# Patient Record
Sex: FEMALE | Race: OTHER | Employment: UNEMPLOYED | URBAN - METROPOLITAN AREA
[De-identification: names, ages, dates, MRNs, and addresses within clinical notes are randomized per-mention and may not be internally consistent; named-entity substitution may affect disease eponyms.]

---

## 2023-11-17 ENCOUNTER — TELEPHONE (OUTPATIENT)
Dept: NEUROLOGY | Facility: CLINIC | Age: 38
End: 2023-11-17

## 2023-11-17 NOTE — TELEPHONE ENCOUNTER
Call to pt to remind re appt 11/21 with Dr Solo Michael in [facility] office @ 10am, unable to reach pt, rec'd fast busy signal x3. No other phone# losted for pt, pt does not have my chart access.

## 2023-11-30 ENCOUNTER — OFFICE VISIT (OUTPATIENT)
Dept: NEUROLOGY | Facility: CLINIC | Age: 38
End: 2023-11-30
Payer: OTHER GOVERNMENT

## 2023-11-30 VITALS
DIASTOLIC BLOOD PRESSURE: 76 MMHG | SYSTOLIC BLOOD PRESSURE: 104 MMHG | TEMPERATURE: 98.2 F | OXYGEN SATURATION: 99 % | WEIGHT: 107.8 LBS | HEART RATE: 81 BPM

## 2023-11-30 DIAGNOSIS — R42 DIZZINESS AND GIDDINESS: Primary | ICD-10-CM

## 2023-11-30 PROCEDURE — 99204 OFFICE O/P NEW MOD 45 MIN: CPT | Performed by: PSYCHIATRY & NEUROLOGY

## 2023-11-30 RX ORDER — HYDROXYZINE HYDROCHLORIDE 10 MG/1
10 TABLET, FILM COATED ORAL EVERY 6 HOURS PRN
COMMUNITY
Start: 2023-09-29

## 2023-11-30 RX ORDER — OLOPATADINE HYDROCHLORIDE 1 MG/ML
1 SOLUTION/ DROPS OPHTHALMIC 2 TIMES DAILY
COMMUNITY
Start: 2023-02-09 | End: 2024-02-09

## 2023-11-30 RX ORDER — LEVOTHYROXINE SODIUM 0.03 MG/1
25 TABLET ORAL DAILY
COMMUNITY
Start: 2023-10-30

## 2023-11-30 RX ORDER — FLUTICASONE PROPIONATE 50 MCG
1 SPRAY, SUSPENSION (ML) NASAL DAILY
COMMUNITY

## 2023-11-30 RX ORDER — FERROUS GLUCONATE 324(38)MG
324 TABLET ORAL
COMMUNITY

## 2023-11-30 NOTE — ASSESSMENT & PLAN NOTE
Ms Solares's understanding is that she has "migraines". On multiple occasions, she told me that her symptoms are of dizziness and lightheadedness. She has headaches but this is not her complaint. I told her I do not believe that she has migraine headaches. Her MRI scan shows nonspecific white matter changes that are not relevant. She also appears to have an empty sella but this is also not contributing to her case. Given that her lightheadedness is most prominent when she gets out of bed and does not occur until then, I would be most concerned about episodes of hypotension. She has a history of palpitations which could be relevant even though her cardiac monitoring study was apparently negative. Still, I think that cardiology consultation is most likely to lead to a diagnosis that might explain her symptoms. ENT pathology seems less likely but is a consideration especially as she does endorse tinnitus when I directly asked her. However, there is no evidence of a neurologic disease at this time causing her symptoms. I explained all of the above to her and believe that she understands. I answered all of her questions to her satisfaction.

## 2023-11-30 NOTE — PROGRESS NOTES
Please note: this note was created with voice recognition software. Occasional wrong word or "sound alike" substitutions may occur due to the inherent limitations of voice recognition software. Read the chart carefully and recognize (using context) where substitutions may have occurred. I am available to discuss any questions. Problem List Items Addressed This Visit    None      Problem List       Dizziness and giddiness    Current Assessment & Plan     Ms Solares's understanding is that she has "migraines". On multiple occasions, she told me that her symptoms are of dizziness and lightheadedness. She has headaches but this is not her complaint. I told her I do not believe that she has migraine headaches. Her MRI scan shows nonspecific white matter changes that are not relevant. She also appears to have an empty sella but this is also not contributing to her case. Given that her lightheadedness is most prominent when she gets out of bed and does not occur until then, I would be most concerned about episodes of hypotension. She has a history of palpitations which could be relevant even though her cardiac monitoring study was apparently negative. Still, I think that cardiology consultation is most likely to lead to a diagnosis that might explain her symptoms. ENT pathology seems less likely but is a consideration especially as she does endorse tinnitus when I directly asked her. However, there is no evidence of a neurologic disease at this time causing her symptoms. I explained all of the above to her and believe that she understands. I answered all of her questions to her satisfaction. I had the pleasure of seeing Renetta Diaz, a 45 y.o. female, for neuromuscular consultation. The referral was for "migraines". She has a history of thyroid cancer was resected. In the last 2 years she has complained of "headaches".   When I asked her to describe what they felt like she told me that they feel like "dizziness, lightheaded". I asked her about the character of her pain and she told me that "things move around too fast and I get sweaty". These symptoms occur when she gets out of bed and moves around; she specifically said that they do not occur if she stays in bed. If she moves her head and turns around they seem to be worse. Sometimes her symptoms are accompanied by palpitations. She had a cardiac monitoring study which was apparently negative; she has not seen a cardiologist.  With further questioning about her pain she told me that she sometimes has pain at the vertex of her head or behind her eyes but that she is not particularly bothered by the pain. She is more disturbed by her orthostatic dizziness and lightheadedness. She had a cranial MRI which showed minor nonspecific white matter changes and an empty sella. There is no history of weakness, numbness, slurred speech, or visual changes. History reviewed. No pertinent past medical history. History reviewed. No pertinent surgical history. Patient has no known allergies. Social History     Tobacco Use   Smoking Status Not on file   Smokeless Tobacco Never       Social History     Substance and Sexual Activity   Alcohol Use None       Social History     Substance and Sexual Activity   Drug Use Not on file       History reviewed. No pertinent family history.       Current Outpatient Medications:     ferrous gluconate (FERGON) 324 mg tablet, Take 324 mg by mouth daily with breakfast, Disp: , Rfl:     fluticasone (FLONASE) 50 mcg/act nasal spray, 1 spray into each nostril daily, Disp: , Rfl:     hydrOXYzine HCL (ATARAX) 10 mg tablet, Take 10 mg by mouth every 6 (six) hours as needed, Disp: , Rfl:     levothyroxine 25 mcg tablet, Take 25 mcg by mouth daily, Disp: , Rfl:     olopatadine (PATANOL) 0.1 % ophthalmic solution, Administer 1 drop to both eyes 2 (two) times a day, Disp: , Rfl:     No results found for any previous visit. No results found for this or any previous visit. No results found for this or any previous visit. No results found for this or any previous visit. No results found for this or any previous visit. No results found for this or any previous visit. No results found for this or any previous visit. No results found for this or any previous visit. No results found for this or any previous visit. No results found for this or any previous visit. No results found for this or any previous visit. No results found for this or any previous visit. No results found for this or any previous visit. Review of Systems   Constitutional:  Negative for appetite change, fatigue and fever. HENT: Negative. Negative for hearing loss, tinnitus, trouble swallowing and voice change. Eyes:  Positive for photophobia (with h/a). Negative for pain and visual disturbance. Respiratory: Negative. Negative for shortness of breath. Cardiovascular: Negative. Negative for palpitations. Gastrointestinal:  Positive for nausea (with h/a). Negative for vomiting. Endocrine: Negative. Negative for cold intolerance. Genitourinary: Negative. Negative for dysuria, frequency and urgency. Musculoskeletal:  Negative for back pain, gait problem, myalgias and neck pain. Skin: Negative. Negative for rash. Allergic/Immunologic: Negative. Neurological:  Positive for dizziness (with h/a) and headaches (has h/a almost daily). Negative for tremors, seizures, syncope, facial asymmetry, speech difficulty, weakness, light-headedness and numbness. Hematological: Negative. Does not bruise/bleed easily. Psychiatric/Behavioral: Negative. Negative for confusion, hallucinations and sleep disturbance. On examination,     Blood pressure 104/76, pulse 81, temperature 98.2 °F (36.8 °C), temperature source Temporal, weight 48.9 kg (107 lb 12.8 oz), SpO2 99 %.     Well developed, well nourished, in no acute distress    Normocephalic, atraumatic    Heart: regular rate and rhythm    Extremities: no clubbing, cyanosis, or edema    Speech and cognition appeared normal    Cranial nerves:  II: Pupils equal, round, and reactive to light. No gross visual field defect. I did not appreciate optic disc edema. III, IV, VI: Extraocular movements intact  V: Normal facial sensation in all three divisions of the trigeminal nerve bilaterally  VII: Normal facial strength  VIII: Hearing intact to finger rub bilaterally  IX, X: Palate elevated symmetrically  XI: Sternocleidomastoid strength normal bilaterally  XII: Tongue protruded in midline without atrophy or fibrillations    Motor:  Normal tone and bulk throughout. Muscle strength testing by the MRC scale was 5/5 in the deltoid, biceps, triceps, wrist extensors, wrist flexors, finger extensors, finger flexors,. Hip flexors, quadriceps, ankle dorsiflexors, ankle plantar flexors, and EHL bilaterally    Deep tendon reflexes:   2+ and symmetrical in the biceps, triceps, brachioradialis, patellas, and ankles  Toes downgoing (no Babinski sign)  No Loving's sign    Sensation: Normal pinprick and light touch throughout    Cerebellar: normal finger to nose and heel to shin testing    Gait: Normal heel, toe, and tandem gait            There are no Patient Instructions on file for this visit. Thank you very much for allowing me to participate in your patient's care. Please feel free to contact me for any questions or concerns. Please be aware of the inherent limitations of voice recognition software, which may result in transcriptional errors.     Mortimer Dose, MD